# Patient Record
Sex: FEMALE | Race: WHITE | NOT HISPANIC OR LATINO
[De-identification: names, ages, dates, MRNs, and addresses within clinical notes are randomized per-mention and may not be internally consistent; named-entity substitution may affect disease eponyms.]

---

## 2019-08-28 ENCOUNTER — TRANSCRIPTION ENCOUNTER (OUTPATIENT)
Age: 59
End: 2019-08-28

## 2019-08-28 ENCOUNTER — APPOINTMENT (OUTPATIENT)
Dept: OTOLARYNGOLOGY | Facility: CLINIC | Age: 59
End: 2019-08-28
Payer: COMMERCIAL

## 2019-08-28 VITALS — HEIGHT: 66 IN | WEIGHT: 199 LBS | BODY MASS INDEX: 31.98 KG/M2

## 2019-08-28 VITALS
HEART RATE: 71 BPM | OXYGEN SATURATION: 98 % | DIASTOLIC BLOOD PRESSURE: 74 MMHG | TEMPERATURE: 99.5 F | SYSTOLIC BLOOD PRESSURE: 107 MMHG

## 2019-08-28 DIAGNOSIS — Z86.59 PERSONAL HISTORY OF OTHER MENTAL AND BEHAVIORAL DISORDERS: ICD-10-CM

## 2019-08-28 DIAGNOSIS — Z82.49 FAMILY HISTORY OF ISCHEMIC HEART DISEASE AND OTHER DISEASES OF THE CIRCULATORY SYSTEM: ICD-10-CM

## 2019-08-28 DIAGNOSIS — Z87.09 PERSONAL HISTORY OF OTHER DISEASES OF THE RESPIRATORY SYSTEM: ICD-10-CM

## 2019-08-28 DIAGNOSIS — F17.200 NICOTINE DEPENDENCE, UNSPECIFIED, UNCOMPLICATED: ICD-10-CM

## 2019-08-28 DIAGNOSIS — J38.1 POLYP OF VOCAL CORD AND LARYNX: ICD-10-CM

## 2019-08-28 DIAGNOSIS — R49.9 UNSPECIFIED VOICE AND RESONANCE DISORDER: ICD-10-CM

## 2019-08-28 PROBLEM — Z00.00 ENCOUNTER FOR PREVENTIVE HEALTH EXAMINATION: Status: ACTIVE | Noted: 2019-08-28

## 2019-08-28 PROCEDURE — 99204 OFFICE O/P NEW MOD 45 MIN: CPT | Mod: 25

## 2019-08-28 PROCEDURE — 31575 DIAGNOSTIC LARYNGOSCOPY: CPT

## 2019-08-28 RX ORDER — ATORVASTATIN CALCIUM 20 MG/1
20 TABLET, FILM COATED ORAL
Qty: 90 | Refills: 0 | Status: ACTIVE | COMMUNITY
Start: 2019-08-21

## 2019-08-28 RX ORDER — SODIUM CHLORIDE 0.65 %
0.65 AEROSOL, SPRAY (ML) NASAL
Qty: 44 | Refills: 0 | Status: ACTIVE | COMMUNITY
Start: 2019-06-10

## 2019-08-28 RX ORDER — AMOXICILLIN AND CLAVULANATE POTASSIUM 875; 125 MG/1; MG/1
875-125 TABLET, COATED ORAL
Qty: 20 | Refills: 0 | Status: ACTIVE | COMMUNITY
Start: 2019-06-10

## 2019-08-28 RX ORDER — FLUTICASONE PROPIONATE 50 UG/1
50 SPRAY, METERED NASAL
Qty: 16 | Refills: 0 | Status: ACTIVE | COMMUNITY
Start: 2019-08-21

## 2019-08-28 RX ORDER — DULOXETINE HYDROCHLORIDE 60 MG/1
60 CAPSULE, DELAYED RELEASE PELLETS ORAL
Qty: 90 | Refills: 0 | Status: ACTIVE | COMMUNITY
Start: 2019-08-21

## 2019-08-28 NOTE — REVIEW OF SYSTEMS
[Nasal Congestion] : nasal congestion [Hoarseness] : hoarseness [Throat Pain] : throat pain [Throat Dryness] : throat dryness [Negative] : Heme/Lymph [Shortness Of Breath] : shortness of breath [Wheezing] : wheezing [Cough] : cough [Heartburn] : heartburn [As Noted in HPI] : as noted in HPI [Anxiety] : anxiety [Depression] : depression

## 2019-09-03 NOTE — HISTORY OF PRESENT ILLNESS
[de-identified] : 58 years old female patient with history of Vocal cord polyps for the past 3 months.  Patient is present today in the office at the request of Dr. Eric for consultation and evaluation of Polyps Right side Vocal Cord Lesion > and Left side Vocal Cord Lesion.  Patient with history of smoking for 40 years.  Patient states she smokes 1 pack per day.

## 2019-09-03 NOTE — PROCEDURE
[Image(s) Captured] : image(s) captured and filed [Topical Lidocaine] : topical lidocaine [Flexible Endoscope] : examined with the flexible endoscope [Serial Number: ___] : Serial Number: [unfilled] [Glottis Arytenoid Cartilages Erythema] : bilateral arytenoid ~M erythema [de-identified] : Polyps Right side Vocal Cord Lesion > and Left side Vocal Cord Lesion

## 2019-09-03 NOTE — REASON FOR VISIT
[Initial Consultation] : an initial consultation for [Spouse] : spouse [FreeTextEntry2] : Vocal cord polyps for the past 3 months.  Patient states her level of severity is a level 9 out of 10 and it occurs constant.  Patient states nothing helps to improve or worsens her Vocal cord polyps for the past 3 months.

## 2019-09-03 NOTE — CONSULT LETTER
[Dear  ___] : Dear  [unfilled], [Consult Letter:] : I had the pleasure of evaluating your patient, [unfilled]. [Please see my note below.] : Please see my note below. [Sincerely,] : Sincerely, [Consult Closing:] : Thank you very much for allowing me to participate in the care of this patient.  If you have any questions, please do not hesitate to contact me. [DrShayy  ___] : Dr. BEST [FreeTextEntry3] : Mook Camara MD, FACS\par Professor of Otolaryngology, [employer] at [facility]\par Director, [facility], [facility]\par , [facility] Service Line, [facility]\par

## 2019-09-30 RX ORDER — METHYLPREDNISOLONE 4 MG/1
4 TABLET ORAL
Qty: 1 | Refills: 0 | Status: ACTIVE | COMMUNITY
Start: 2019-09-30 | End: 1900-01-01

## 2019-10-01 ENCOUNTER — RESULT REVIEW (OUTPATIENT)
Age: 59
End: 2019-10-01

## 2019-10-01 ENCOUNTER — APPOINTMENT (OUTPATIENT)
Dept: OTOLARYNGOLOGY | Facility: AMBULATORY SURGERY CENTER | Age: 59
End: 2019-10-01

## 2019-10-01 ENCOUNTER — OUTPATIENT (OUTPATIENT)
Dept: OUTPATIENT SERVICES | Facility: HOSPITAL | Age: 59
LOS: 1 days | Discharge: HOME | End: 2019-10-01
Payer: COMMERCIAL

## 2019-10-01 PROCEDURE — 31541 LARYNSCOP W/TUMR EXC + SCOPE: CPT

## 2019-10-02 LAB — SURGICAL PATHOLOGY STUDY: SIGNIFICANT CHANGE UP

## 2019-10-30 ENCOUNTER — TRANSCRIPTION ENCOUNTER (OUTPATIENT)
Age: 59
End: 2019-10-30

## 2019-10-31 ENCOUNTER — TRANSCRIPTION ENCOUNTER (OUTPATIENT)
Age: 59
End: 2019-10-31

## 2019-11-04 ENCOUNTER — APPOINTMENT (OUTPATIENT)
Dept: OTOLARYNGOLOGY | Facility: CLINIC | Age: 59
End: 2019-11-04

## 2019-11-05 ENCOUNTER — APPOINTMENT (OUTPATIENT)
Dept: OTOLARYNGOLOGY | Facility: CLINIC | Age: 59
End: 2019-11-05
Payer: COMMERCIAL

## 2019-11-05 VITALS
TEMPERATURE: 98.1 F | OXYGEN SATURATION: 99 % | DIASTOLIC BLOOD PRESSURE: 82 MMHG | SYSTOLIC BLOOD PRESSURE: 125 MMHG | HEART RATE: 71 BPM

## 2019-11-05 DIAGNOSIS — J38.3 OTHER DISEASES OF VOCAL CORDS: ICD-10-CM

## 2019-11-05 DIAGNOSIS — R49.0 DYSPHONIA: ICD-10-CM

## 2019-11-05 PROCEDURE — 99213 OFFICE O/P EST LOW 20 MIN: CPT

## 2019-11-05 RX ORDER — DOCUSATE SODIUM 100 MG/1
100 CAPSULE ORAL TWICE DAILY
Qty: 1 | Refills: 0 | Status: COMPLETED | COMMUNITY
Start: 2019-09-30 | End: 2019-11-05

## 2019-11-05 RX ORDER — AMOXICILLIN 400 MG/5ML
400 FOR SUSPENSION ORAL
Qty: 1 | Refills: 0 | Status: COMPLETED | COMMUNITY
Start: 2019-09-30 | End: 2019-11-05

## 2019-11-05 RX ORDER — ACETAMINOPHEN AND CODEINE PHOSPHATE 120; 12 MG/5ML; MG/5ML
120-12 SOLUTION ORAL
Qty: 1 | Refills: 0 | Status: COMPLETED | COMMUNITY
Start: 2019-09-30 | End: 2019-11-05

## 2019-11-05 NOTE — REVIEW OF SYSTEMS
[Nasal Congestion] : nasal congestion [Hoarseness] : hoarseness [Throat Pain] : throat pain [Throat Dryness] : throat dryness [Shortness Of Breath] : shortness of breath [Wheezing] : wheezing [Cough] : cough [Heartburn] : heartburn [As Noted in HPI] : as noted in HPI [Anxiety] : anxiety [Depression] : depression [Negative] : Heme/Lymph

## 2019-11-05 NOTE — CONSULT LETTER
[Please see my note below.] : Please see my note below. [Consult Closing:] : Thank you very much for allowing me to participate in the care of this patient.  If you have any questions, please do not hesitate to contact me. [Sincerely,] : Sincerely, [DrShayy  ___] : Dr. BEST [FreeTextEntry3] : Mook Camara MD, FACS\par Professor of Otolaryngology, [facility] [employer] at [facility]\par Director, [facility], [facility]\par , [facility] Service Line, [facility]\par

## 2019-11-05 NOTE — REASON FOR VISIT
[Subsequent Evaluation] : a subsequent evaluation for [Spouse] : spouse [FreeTextEntry2] : Bilateral vocal cord lesions. Status post  Direct Laryngoscopy, Excision of Bilateral Vocal Cord Lesion

## 2019-11-05 NOTE — HISTORY OF PRESENT ILLNESS
[de-identified] : 58 years old female patient with history of  Bilateral vocal cord lesions. Status post  Direct Laryngoscopy, Excision of Bilateral Vocal Cord Lesion .  Patient with persistent for smoking for 40 years.  Patient states she smokes 1 pack per day.\par Surgical Final Report\par Final Diagnosis\par 1. Vocal cord, right, lesion; biopsy:\par - Stratified squamous epithelium with mild atypia and\par stromal edema, consistent with vocal cord nodule, see note\par 2. Vocal cord, left, lesion; biopsy:\par - Stratified squamous epithelium with mild atypia and\par stromal edema, consistent with vocal cord nodule, see note\par \par Note: Squamous epithelium with mild atypia, favor reactive atypia\par over low grade dysplasia.\par Patient is asymptomatic and healing well.

## 2021-11-01 ENCOUNTER — EMERGENCY (EMERGENCY)
Facility: HOSPITAL | Age: 61
LOS: 0 days | Discharge: LEFT BEFORE DISCHARGE | End: 2021-11-02
Attending: EMERGENCY MEDICINE | Admitting: EMERGENCY MEDICINE
Payer: COMMERCIAL

## 2021-11-01 VITALS
SYSTOLIC BLOOD PRESSURE: 147 MMHG | DIASTOLIC BLOOD PRESSURE: 85 MMHG | WEIGHT: 198.42 LBS | OXYGEN SATURATION: 99 % | RESPIRATION RATE: 18 BRPM | TEMPERATURE: 98 F | HEART RATE: 83 BPM

## 2021-11-01 DIAGNOSIS — F17.200 NICOTINE DEPENDENCE, UNSPECIFIED, UNCOMPLICATED: ICD-10-CM

## 2021-11-01 DIAGNOSIS — R51.9 HEADACHE, UNSPECIFIED: ICD-10-CM

## 2021-11-01 DIAGNOSIS — E78.5 HYPERLIPIDEMIA, UNSPECIFIED: ICD-10-CM

## 2021-11-01 DIAGNOSIS — R20.2 PARESTHESIA OF SKIN: ICD-10-CM

## 2021-11-01 DIAGNOSIS — M54.10 RADICULOPATHY, SITE UNSPECIFIED: ICD-10-CM

## 2021-11-01 DIAGNOSIS — R42 DIZZINESS AND GIDDINESS: ICD-10-CM

## 2021-11-01 DIAGNOSIS — M19.012 PRIMARY OSTEOARTHRITIS, LEFT SHOULDER: ICD-10-CM

## 2021-11-01 DIAGNOSIS — Z20.822 CONTACT WITH AND (SUSPECTED) EXPOSURE TO COVID-19: ICD-10-CM

## 2021-11-01 DIAGNOSIS — M54.2 CERVICALGIA: ICD-10-CM

## 2021-11-01 DIAGNOSIS — R20.0 ANESTHESIA OF SKIN: ICD-10-CM

## 2021-11-01 LAB
ALBUMIN SERPL ELPH-MCNC: 4.4 G/DL — SIGNIFICANT CHANGE UP (ref 3.5–5.2)
ALP SERPL-CCNC: 73 U/L — SIGNIFICANT CHANGE UP (ref 30–115)
ALT FLD-CCNC: 12 U/L — SIGNIFICANT CHANGE UP (ref 0–41)
ANION GAP SERPL CALC-SCNC: 9 MMOL/L — SIGNIFICANT CHANGE UP (ref 7–14)
APTT BLD: SIGNIFICANT CHANGE UP SEC (ref 27–39.2)
AST SERPL-CCNC: 16 U/L — SIGNIFICANT CHANGE UP (ref 0–41)
BASOPHILS # BLD AUTO: 0.05 K/UL — SIGNIFICANT CHANGE UP (ref 0–0.2)
BASOPHILS NFR BLD AUTO: 0.7 % — SIGNIFICANT CHANGE UP (ref 0–1)
BILIRUB SERPL-MCNC: <0.2 MG/DL — SIGNIFICANT CHANGE UP (ref 0.2–1.2)
BUN SERPL-MCNC: 13 MG/DL — SIGNIFICANT CHANGE UP (ref 10–20)
CALCIUM SERPL-MCNC: 9.2 MG/DL — SIGNIFICANT CHANGE UP (ref 8.5–10.1)
CHLORIDE SERPL-SCNC: 107 MMOL/L — SIGNIFICANT CHANGE UP (ref 98–110)
CO2 SERPL-SCNC: 24 MMOL/L — SIGNIFICANT CHANGE UP (ref 17–32)
CREAT SERPL-MCNC: 0.6 MG/DL — LOW (ref 0.7–1.5)
EOSINOPHIL # BLD AUTO: 0.16 K/UL — SIGNIFICANT CHANGE UP (ref 0–0.7)
EOSINOPHIL NFR BLD AUTO: 2.3 % — SIGNIFICANT CHANGE UP (ref 0–8)
GLUCOSE SERPL-MCNC: 108 MG/DL — HIGH (ref 70–99)
HCT VFR BLD CALC: 37.1 % — SIGNIFICANT CHANGE UP (ref 37–47)
HGB BLD-MCNC: 12.3 G/DL — SIGNIFICANT CHANGE UP (ref 12–16)
IMM GRANULOCYTES NFR BLD AUTO: 0.3 % — SIGNIFICANT CHANGE UP (ref 0.1–0.3)
INR BLD: SIGNIFICANT CHANGE UP RATIO (ref 0.65–1.3)
LYMPHOCYTES # BLD AUTO: 1.8 K/UL — SIGNIFICANT CHANGE UP (ref 1.2–3.4)
LYMPHOCYTES # BLD AUTO: 25.8 % — SIGNIFICANT CHANGE UP (ref 20.5–51.1)
MCHC RBC-ENTMCNC: 27.6 PG — SIGNIFICANT CHANGE UP (ref 27–31)
MCHC RBC-ENTMCNC: 33.2 G/DL — SIGNIFICANT CHANGE UP (ref 32–37)
MCV RBC AUTO: 83.4 FL — SIGNIFICANT CHANGE UP (ref 81–99)
MONOCYTES # BLD AUTO: 0.42 K/UL — SIGNIFICANT CHANGE UP (ref 0.1–0.6)
MONOCYTES NFR BLD AUTO: 6 % — SIGNIFICANT CHANGE UP (ref 1.7–9.3)
NEUTROPHILS # BLD AUTO: 4.52 K/UL — SIGNIFICANT CHANGE UP (ref 1.4–6.5)
NEUTROPHILS NFR BLD AUTO: 64.9 % — SIGNIFICANT CHANGE UP (ref 42.2–75.2)
NRBC # BLD: 0 /100 WBCS — SIGNIFICANT CHANGE UP (ref 0–0)
PLATELET # BLD AUTO: 219 K/UL — SIGNIFICANT CHANGE UP (ref 130–400)
POTASSIUM SERPL-MCNC: 3.9 MMOL/L — SIGNIFICANT CHANGE UP (ref 3.5–5)
POTASSIUM SERPL-SCNC: 3.9 MMOL/L — SIGNIFICANT CHANGE UP (ref 3.5–5)
PROT SERPL-MCNC: 6.5 G/DL — SIGNIFICANT CHANGE UP (ref 6–8)
PROTHROM AB SERPL-ACNC: SIGNIFICANT CHANGE UP SEC (ref 9.95–12.87)
RBC # BLD: 4.45 M/UL — SIGNIFICANT CHANGE UP (ref 4.2–5.4)
RBC # FLD: 13.8 % — SIGNIFICANT CHANGE UP (ref 11.5–14.5)
SARS-COV-2 RNA SPEC QL NAA+PROBE: SIGNIFICANT CHANGE UP
SODIUM SERPL-SCNC: 140 MMOL/L — SIGNIFICANT CHANGE UP (ref 135–146)
TROPONIN T SERPL-MCNC: <0.01 NG/ML — SIGNIFICANT CHANGE UP
WBC # BLD: 6.97 K/UL — SIGNIFICANT CHANGE UP (ref 4.8–10.8)
WBC # FLD AUTO: 6.97 K/UL — SIGNIFICANT CHANGE UP (ref 4.8–10.8)

## 2021-11-01 PROCEDURE — 99283 EMERGENCY DEPT VISIT LOW MDM: CPT

## 2021-11-01 PROCEDURE — 99220: CPT

## 2021-11-01 PROCEDURE — 70496 CT ANGIOGRAPHY HEAD: CPT | Mod: 26,MA

## 2021-11-01 PROCEDURE — 70498 CT ANGIOGRAPHY NECK: CPT | Mod: 26,MA

## 2021-11-01 PROCEDURE — 0042T: CPT

## 2021-11-01 PROCEDURE — 93010 ELECTROCARDIOGRAM REPORT: CPT

## 2021-11-01 RX ORDER — ATORVASTATIN CALCIUM 80 MG/1
40 TABLET, FILM COATED ORAL AT BEDTIME
Refills: 0 | Status: DISCONTINUED | OUTPATIENT
Start: 2021-11-01 | End: 2021-11-02

## 2021-11-01 RX ORDER — DULOXETINE HYDROCHLORIDE 30 MG/1
60 CAPSULE, DELAYED RELEASE ORAL ONCE
Refills: 0 | Status: COMPLETED | OUTPATIENT
Start: 2021-11-01 | End: 2021-11-01

## 2021-11-01 RX ORDER — ASPIRIN/CALCIUM CARB/MAGNESIUM 324 MG
81 TABLET ORAL DAILY
Refills: 0 | Status: DISCONTINUED | OUTPATIENT
Start: 2021-11-01 | End: 2021-11-02

## 2021-11-01 RX ADMIN — Medication 81 MILLIGRAM(S): at 21:50

## 2021-11-01 RX ADMIN — DULOXETINE HYDROCHLORIDE 60 MILLIGRAM(S): 30 CAPSULE, DELAYED RELEASE ORAL at 23:55

## 2021-11-01 RX ADMIN — ATORVASTATIN CALCIUM 40 MILLIGRAM(S): 80 TABLET, FILM COATED ORAL at 21:47

## 2021-11-01 NOTE — ED ADULT NURSE NOTE - NSIMPLEMENTINTERV_GEN_ALL_ED
Implemented All Fall Risk Interventions:  Pool to call system. Call bell, personal items and telephone within reach. Instruct patient to call for assistance. Room bathroom lighting operational. Non-slip footwear when patient is off stretcher. Physically safe environment: no spills, clutter or unnecessary equipment. Stretcher in lowest position, wheels locked, appropriate side rails in place. Provide visual cue, wrist band, yellow gown, etc. Monitor gait and stability. Monitor for mental status changes and reorient to person, place, and time. Review medications for side effects contributing to fall risk. Reinforce activity limits and safety measures with patient and family.

## 2021-11-01 NOTE — CONSULT NOTE ADULT - ATTENDING COMMENTS
Pt is a 61 yo F with PMHx of HLD, tobacco dependence, who presents with c/o left hand paresthesia. LKW 2200. NIHSS 0, subjective tingling, no hypoesthesia on exam, fine motor movements intact. CT head without acute process. Pt is not a tPA candidate given LKW > 4.5 hrs ago. CTA head/neck without significant flow limiting stenosis. Recommend admission to obs, MRI brain/cervical spine without contrast, ASA/statin.

## 2021-11-01 NOTE — CONSULT NOTE ADULT - SUBJECTIVE AND OBJECTIVE BOX
Stroke Consult  Note:    RAVIN JOHNSON    HPI: 60Y right hand dominant female with pmhx of HLD presents to the ED for left arm numbness/tingling. Patient states it began in the hand yesterday x 10pm. Today it has progressed to the arm and half the thigh. Patient also admits to some neck pain. Denies taking blood thinners or any hx of strokes. On exam patient has subjective tingling but NIH-0 for objective sensation.       Social history: Admits smoking since the age 15 2 packs per day      Relevant Brain Tissue Imaging:  < from: CT Brain Stroke Protocol (11.01.21 @ 12:49) >    IMPRESSION:    No acute intracranial pathology.    Communication: The summary of above findings were discussed with readback confirmation with LETTY Altamirano by radiologist Dr. Benjamin on 11/1/2021 at 12:53 PM.      --- End of Report ---      < end of copied text >    Relevant Cerebrovascular Imaging: Pending official read      Relevant blood tests:                          12.3   6.97  )-----------( 219      ( 01 Nov 2021 12:15 )             37.1   11-01    140  |  107  |  13  ----------------------------<  108<H>  3.9   |  24  |  0.6<L>    Ca    9.2      01 Nov 2021 12:15    TPro  6.5  /  Alb  4.4  /  TBili  <0.2  /  DBili  x   /  AST  16  /  ALT  12  /  AlkPhos  73  11-01        Home Medications:  PT/INR - ( 01 Nov 2021 12:15 )   PT: TNP sec;   INR: TNP ratio         PTT - ( 01 Nov 2021 12:15 )  PTT:TNP sec    MEDICATIONS  (STANDING):      10. PT/OT/Speech/Rehab/S&Sw/ Cognitive eval results and recommendations:    11. Exam:    Vital Signs Last 24 Hrs  T(C): 36.3 (01 Nov 2021 13:20), Max: 36.4 (01 Nov 2021 12:37)  T(F): 97.3 (01 Nov 2021 13:20), Max: 97.5 (01 Nov 2021 12:37)  HR: 98 (01 Nov 2021 13:20) (83 - 98)  BP: 127/72 (01 Nov 2021 13:20) (127/72 - 147/85)  BP(mean): --  RR: 18 (01 Nov 2021 12:37) (18 - 18)  SpO2: 98% (01 Nov 2021 13:20) (98% - 99%)    12.   NIH STROKE SCALE  Item	                                                        Score  1 a.	Level of Consciousness	               	0  1 b. LOC Questions	                                0  1 c.	LOC Commands	                               	0  2.	Best Gaze	                                        0  3.	Visual	                                                0  4.	Facial Palsy	                                        0  5 a.	Motor Arm - Left	                                0  5 b.	Motor Arm - Right	                        0  6 a.	Motor Leg - Left	                                0  6 b.	Motor Leg - Right	                                0  7.	Limb Ataxia	                                        0  8.	Sensory	                                                0  9.	Language	                                        0  10.	Dysarthria	                                        0  11.	Extinction and Inattention  	        0  ______________________________________  TOTAL	                                                        0    Total NIHSS on admission:      NIHSS yesterday:      NIHSS today: 0    mRS:  0 No symptoms at all  1 No significant disability despite symptoms; able to carry out all usual duties and activities without assistance  2 Slight disability; unable to carry out all previous activities, but able to look after own affairs  3 Moderate disability; requiring some help, but able to walk without assistance  4 Moderately severe disability; unable to walk without assistance and unable to attend to own bodily needs without assistance  5 Severe disability; bedridden, incontinent and requiring constant nursing care and attention  6 Dead    Assessment:   60Y right hand dominant female with pmhx of HLD presents to the ED for left arm numbness/tingling. Patient states it began in the hand yesterday x 10pm. Today it has progressed to the arm and half the thigh. Patient also admits to some neck pain. Denies taking blood thinners or any hx of strokes. On exam patient has subjective tingling but NIH-0 for objective sensation. CTH-negative. CTA and CTP- pending official read.    Suggestions:  Patient can be in ED obs  MRI brain non contrast  MRI cervical spine non contrast  ASA 81 mg  a1c, lipid panel, tsh  echo with bubble   Case discussed with Dr. thomas. Pending note to follow

## 2021-11-01 NOTE — ED CDU PROVIDER INITIAL DAY NOTE - NS ED ROS FT
CONST: No fever, chills or bodyaches  EYES: No pain, redness, drainage or visual changes.  ENT: No ear pain or discharge, nasal discharge or congestion. No sore throat  CARD: No chest pain, palpitations  RESP: No SOB, cough, hemoptysis. No hx of asthma or COPD  GI: No abdominal pain, N/V/D  MS: No joint pain, back pain or extremity pain/injury  SKIN: No rashes  NEURO: No headache, dizziness

## 2021-11-01 NOTE — ED PROVIDER NOTE - PHYSICAL EXAMINATION
CONSTITUTIONAL: Well-developed; well-nourished; in no acute distress.   SKIN: warm, dry  HEAD: Normocephalic; atraumatic.  EYES: PERRL, EOMI, normal sclera and conjunctiva   ENT: No nasal discharge; airway clear.  NECK: Supple; non tender.  CARD: S1, S2 normal; no murmurs, gallops, or rubs. Regular rate and rhythm.   RESP: No wheezes, rales or rhonchi.  ABD: soft ntnd  EXT: Normal ROM.  No clubbing, cyanosis or edema.   LYMPH: No acute cervical adenopathy.  NEURO: Alert, oriented, grossly unremarkable. normal strength and sensation. normal finger to nose. no gait ataxia. no facial droop.  PSYCH: Cooperative, appropriate.

## 2021-11-01 NOTE — ED CDU PROVIDER INITIAL DAY NOTE - OBJECTIVE STATEMENT
Pt with hx of HLD presents with pins and needles to left arm and left side of face since 10 pm last night. Denies HA, weakness, CP, SOB, diaphoresis. Seen by neuro and placed in OBS for work up

## 2021-11-01 NOTE — ED PROVIDER NOTE - OBJECTIVE STATEMENT
59 yo no pmhx presenting with L arm numbness since last night 10 pm with dizziness and headache, neck pain. denies fever, chest pain, sob, syncope, facial droop.

## 2021-11-01 NOTE — ED PROVIDER NOTE - CLINICAL SUMMARY MEDICAL DECISION MAKING FREE TEXT BOX
59 Y/O F WITH L ARM PARESTHESIAS SINCE LAST NIGHT. NOT A TPA CANDIDATE AS OUT FO THE WINDOW. NOT AN NI CANDIDATE, NIHSS-0. CT  SCANS REVIEWED. PT TRANSFERRED TO [facility].

## 2021-11-01 NOTE — ED CDU PROVIDER INITIAL DAY NOTE - ATTENDING CONTRIBUTION TO CARE
60F PMH HL smoker, p/w LUE pins and needles sensation since yesterday 10pm as well as L cheek. no numbness, weakness. no blurry vision, slurred speech. pt has had intermittent has and neck pain. no trouble walking. no cp, sob. no fever, cough. pt seen in ED as stroke code, CT unremarkable, NIH 0, seen by neuro, rec obs for tia pathway to get echo, MRI.    on exam, AFVSS, well nicki nad, ncat, eomi, perrla, mmm, lctab, rrr nl s1s2 no mrg, abd soft ntnd, aaox3, CN 2-12 intact, No nystagmus.  5/5 motor x 4 ext, SILT x 4 extremities, No facial droop or slurred speech. No pronator drift. No midline C/T/L tenderness to palpation or step off.  no le edema or calf ttp,     a/p; LUE tingling, CT neg, neuro following, pending MRI, echo, labs re-eval.

## 2021-11-01 NOTE — ED PROVIDER NOTE - NS ED ROS FT

## 2021-11-01 NOTE — ED PROVIDER NOTE - ATTENDING CONTRIBUTION TO CARE
I personally evaluated the patient. I reviewed the Resident’s or Physician Assistant’s note (as assigned above), and agree with the findings and plan except as documented in my note.   59 Y/O F NO SIG PMHX C/O L ARM NUMBNESS SINCE 10PM LAST NIGHT. + ASSOCIATED DIZZINESS. NO VERTIGINOUS. NO ARM WEAKNESS. + ASSOCIATED HEADACHE AND NECK PAIN. NO VISION OR SPEECH CHANGES. NO OTHER EXTREMITY WEAKNESS. NO ATAXIA. NO N/V. NO RECENT ILLNESS, FEVER, CHILLS. VITALS NOTED. ALERT OX3 NAD. NCAT PERRL EOMI. CN 2-12 INTACT NORMAL SPEECH. NO FACIAL DROOP. 5/5 MOTOR STRENGTH B/L UPPER AND B/L LOWER EXT. NO PRONATOR DRIFT. NORMAL FINGER TO NOSE B/L. NO SENSORY DEFICIT. RRR. LUNGS CLEAR B/L. ABD- SOFT NONTENDER. BACK NONTENDER. 2+ RADIAL AND PEDAL PULSES B/L. NIHSS-0.

## 2021-11-02 VITALS
TEMPERATURE: 97 F | OXYGEN SATURATION: 98 % | SYSTOLIC BLOOD PRESSURE: 118 MMHG | DIASTOLIC BLOOD PRESSURE: 56 MMHG | HEART RATE: 88 BPM | RESPIRATION RATE: 18 BRPM

## 2021-11-02 LAB
A1C WITH ESTIMATED AVERAGE GLUCOSE RESULT: 5.5 % — SIGNIFICANT CHANGE UP (ref 4–5.6)
CHOLEST SERPL-MCNC: 185 MG/DL — SIGNIFICANT CHANGE UP
ESTIMATED AVERAGE GLUCOSE: 111 MG/DL — SIGNIFICANT CHANGE UP (ref 68–114)
HDLC SERPL-MCNC: 40 MG/DL — LOW
LIPID PNL WITH DIRECT LDL SERPL: 126 MG/DL — HIGH
NON HDL CHOLESTEROL: 145 MG/DL — HIGH
TRIGL SERPL-MCNC: 99 MG/DL — SIGNIFICANT CHANGE UP

## 2021-11-02 PROCEDURE — 93306 TTE W/DOPPLER COMPLETE: CPT | Mod: 26

## 2021-11-02 PROCEDURE — 99217: CPT

## 2021-11-02 NOTE — ED ADULT NURSE REASSESSMENT NOTE - NS ED NURSE REASSESS COMMENT FT1
Pt assessed A/O times 4 Vs stable on cardiac monitor denies chest pain denies SOB no N/V no dizziness C/O left hand tingling sensation , no N/V  speech clear ,ambulate steady ,pt is seen evaluate by Ed attending with order for MRI waiting for the test .

## 2021-11-02 NOTE — ED CDU PROVIDER DISPOSITION NOTE - NSFOLLOWUPCLINICS_GEN_ALL_ED_FT
[facility]  Neurology  [address] [place] [address], [facility] [address]  [place] [address]  Phone: [phone]  Fax:   Follow Up Time: 1-3 Days

## 2021-11-02 NOTE — ED CDU PROVIDER DISPOSITION NOTE - NSFOLLOWUPINSTRUCTIONS_ED_ALL_ED_FT
WHAT YOU NEED TO KNOW:    Paresthesia is numbness, tingling, or burning. It can happen in any part of your body, but usually occurs in your legs, feet, arms, or hands.    DISCHARGE INSTRUCTIONS:    Return to the emergency department if:     You have severe pain along with numbness and tingling.  Your legs suddenly become cold. You have trouble moving your legs, and they ache.  You have increased weakness in a part of your body.  You have uncontrolled movements.    Contact your healthcare provider or neurologist if:     Your symptoms do not improve.  You have symptoms in more than one part of your body.  You have questions or concerns about your condition or care.    Manage paresthesia:     Protect the area from injury. You may injure or burn yourself if you lose feeling in the area. Be careful when you touch anything that could be hot. Wear sturdy shoes to protect your feet. Ask about other ways to protect yourself.   Go to physical or occupational therapy if directed. Your provider may recommend therapy if you have a condition such as carpal tunnel syndrome. A physical therapist can teach you exercises to help strengthen the area or increase your ability to move it. An occupational therapist can help you find new ways to do your daily activities.  Manage health conditions that can cause paresthesia. Work with your diabetes specialist if you have uncontrolled diabetes. A dietitian or your healthcare provider can help you create a meal plan if you have low vitamin B levels. Your provider can help you manage your health if you have multiple sclerosis or you had a stroke. It is important to manage health conditions to stop paresthesia or prevent it from getting worse.  Follow up with your healthcare provider or neurologist as directed: Your healthcare provider may refer you to a specialist. Write down your questions so you remember to ask them during your visits.

## 2021-11-02 NOTE — ED CDU PROVIDER DISPOSITION NOTE - CLINICAL COURSE
Pt code stroke, ct negative, pending MRI, pt wants to AMA and f/u outpatient with neurology for MRI. pt aaox3, no numbness, weakness, gait normal. Pt code stroke, ct negative, pending MRI, pt wants to AMA and f/u outpatient with neurology for MRI. pt aaox3, no numbness, weakness, gait normal.      Patient understood risks and had capacity.  Results given.  Discharged home.  Return precautions given.

## 2021-11-02 NOTE — ED CDU PROVIDER DISPOSITION NOTE - PATIENT PORTAL LINK FT
You can access the FollowMyHealth Patient Portal offered by [facility] by registering at the following website: [email or web address]. By joining [employer]’s FollowMyHealth portal, you will also be able to view your health information using other applications (apps) compatible with our system.

## 2021-11-02 NOTE — ED CDU PROVIDER SUBSEQUENT DAY NOTE - MEDICAL DECISION MAKING DETAILS
60-year-old female placed in observation for left arm numbness and tingling that began 2 days prior.  Patient has a history of left shoulder arthropathy and radiculopathy which was evaluated by orthopedics outpatient.  Patient reports she is never experienced the symptoms of numbness and tingling in her left arm.  Patient was evaluated by the initial ED team and neurology for stroke.  CTH neg for ich, midline shift, or hydrocephalus.  CT angio negative for LVO.  Degenerative changes noted in the cervical spine.  EKG unremarkable.  Labs noted to have elevated LDL and decreased HDL consistent with dyslipidemia.  Patient was placed in observation pending MR head, MR cervical spine, echo with bubble study.  On my evaluation patient is noted to have decreased sensation of the left upper extremity with no change in motor strength and/or reflexes.  Patient is noted to have left shoulder pain with range of motion which also extends to the left trapezius and rhomboids.  No midline cervical tenderness with a negative compression and Spurling test.